# Patient Record
Sex: MALE | Race: WHITE
[De-identification: names, ages, dates, MRNs, and addresses within clinical notes are randomized per-mention and may not be internally consistent; named-entity substitution may affect disease eponyms.]

---

## 2021-10-04 ENCOUNTER — HOSPITAL ENCOUNTER (EMERGENCY)
Dept: HOSPITAL 94 - ER | Age: 61
Discharge: NURSING, REHAB OR LONG-TERM CARE | End: 2021-10-04
Payer: COMMERCIAL

## 2021-10-04 VITALS — SYSTOLIC BLOOD PRESSURE: 129 MMHG | DIASTOLIC BLOOD PRESSURE: 71 MMHG

## 2021-10-04 VITALS — WEIGHT: 180.38 LBS | HEIGHT: 67 IN | BODY MASS INDEX: 28.31 KG/M2

## 2021-10-04 DIAGNOSIS — R07.89: Primary | ICD-10-CM

## 2021-10-04 DIAGNOSIS — Z86.19: ICD-10-CM

## 2021-10-04 PROCEDURE — 99285 EMERGENCY DEPT VISIT HI MDM: CPT

## 2021-10-04 PROCEDURE — 99284 EMERGENCY DEPT VISIT MOD MDM: CPT

## 2021-10-04 PROCEDURE — 74174 CTA ABD&PLVS W/CONTRAST: CPT

## 2021-10-04 PROCEDURE — 71275 CT ANGIOGRAPHY CHEST: CPT

## 2021-10-04 NOTE — NUR
CALL FROM [facility] STATING AMBULANCE WAS DIVERTED TO AN EMERGENCY, NO UPDATED ETA 
GIVEN, PATIENT UPDATED. SITTING UP AT BEDSIDE, NO SIGNS OF DISTRESS NOTED.